# Patient Record
Sex: FEMALE | Race: WHITE | NOT HISPANIC OR LATINO | Employment: FULL TIME | URBAN - METROPOLITAN AREA
[De-identification: names, ages, dates, MRNs, and addresses within clinical notes are randomized per-mention and may not be internally consistent; named-entity substitution may affect disease eponyms.]

---

## 2020-08-12 ENCOUNTER — APPOINTMENT (OUTPATIENT)
Dept: RHEUMATOLOGY | Facility: MEDICAL CENTER | Age: 35
End: 2020-08-12
Payer: MEDICAID

## 2020-08-17 ENCOUNTER — OFFICE VISIT (OUTPATIENT)
Dept: RHEUMATOLOGY | Facility: MEDICAL CENTER | Age: 35
End: 2020-08-17
Payer: MEDICAID

## 2020-08-17 ENCOUNTER — APPOINTMENT (OUTPATIENT)
Dept: RADIOLOGY | Facility: IMAGING CENTER | Age: 35
End: 2020-08-17
Attending: INTERNAL MEDICINE
Payer: MEDICAID

## 2020-08-17 VITALS
WEIGHT: 201 LBS | RESPIRATION RATE: 14 BRPM | OXYGEN SATURATION: 96 % | HEIGHT: 71 IN | BODY MASS INDEX: 28.14 KG/M2 | SYSTOLIC BLOOD PRESSURE: 110 MMHG | DIASTOLIC BLOOD PRESSURE: 60 MMHG | TEMPERATURE: 98.3 F | HEART RATE: 82 BPM

## 2020-08-17 DIAGNOSIS — Z79.899 LONG-TERM USE OF PLAQUENIL: ICD-10-CM

## 2020-08-17 DIAGNOSIS — M05.79 RHEUMATOID ARTHRITIS INVOLVING MULTIPLE SITES WITH POSITIVE RHEUMATOID FACTOR (HCC): ICD-10-CM

## 2020-08-17 DIAGNOSIS — M06.9 RHEUMATOID ARTHRITIS, INVOLVING UNSPECIFIED SITE, UNSPECIFIED RHEUMATOID FACTOR PRESENCE: ICD-10-CM

## 2020-08-17 PROCEDURE — 99204 OFFICE O/P NEW MOD 45 MIN: CPT | Performed by: INTERNAL MEDICINE

## 2020-08-17 PROCEDURE — 77077 JOINT SURVEY SINGLE VIEW: CPT | Mod: TC | Performed by: INTERNAL MEDICINE

## 2020-08-17 PROCEDURE — 72070 X-RAY EXAM THORAC SPINE 2VWS: CPT | Mod: TC | Performed by: INTERNAL MEDICINE

## 2020-08-17 RX ORDER — HYDROXYCHLOROQUINE SULFATE 200 MG/1
TABLET, FILM COATED ORAL
Qty: 180 TAB | Refills: 0 | Status: SHIPPED | OUTPATIENT
Start: 2020-08-17

## 2020-08-17 RX ORDER — ACETAMINOPHEN 500 MG
500-1000 TABLET ORAL
COMMUNITY

## 2020-08-17 RX ORDER — VENLAFAXINE 75 MG/1
75 TABLET ORAL
COMMUNITY

## 2020-08-17 RX ORDER — CLONAZEPAM 1 MG/1
0.5 TABLET ORAL 2 TIMES DAILY
COMMUNITY

## 2020-08-17 RX ORDER — LAMOTRIGINE 100 MG/1
200 TABLET ORAL DAILY
COMMUNITY

## 2020-08-17 RX ORDER — MAGNESIUM OXIDE 400 MG/1
200 TABLET ORAL
COMMUNITY

## 2020-08-17 RX ORDER — CHLORAL HYDRATE 500 MG
1000 CAPSULE ORAL
COMMUNITY

## 2020-08-17 RX ORDER — LITHIUM CARBONATE 300 MG
300 TABLET ORAL 2 TIMES DAILY
COMMUNITY

## 2020-08-17 ASSESSMENT — JOINT PAIN
TOTAL NUMBER OF TENDER JOINTS: 8
TOTAL NUMBER OF SWOLLEN JOINTS: 0
TOTAL NUMBER OF TENDER JOINTS: 12

## 2020-08-17 NOTE — ASSESSMENT & PLAN NOTE
Patient here to be evaluated for RA. Patient states symptoms started at the age of 13 years aol with symptoms of pain in joints following a respiratory illness/flu like symptoms that kep relapsing. Patient states that while evaluating and states that she had a positive RF. Patient took Motrin with benefit. Patient states that while she was pregnant patient did feel better, but then after delivery, symptoms recurred with a vengeance. Patient was then rechecked at 29 years old and states was positive RF and again at 34 years old and was positive, was started on Plaquenil for the last year. Patient currently off for the last 2 months secondary to finances. Patient was on for 9 months which did help.    Co-morbidities include chronic low back pain, bipolar disorder onlithium, tachycardia, hx of depression,     Fam Hx  M-OA, HTN, psoriasis  F-COPD, diabetes, obesity, mental disorders  Sisters 1/2- schizophrenia, and bipolar  Great Aunt-RA    Soc HX  No tob  ETOH 3-4 x/week  No blood tx  No tattoos    S/p prednisone-causes daniel

## 2020-08-17 NOTE — PROGRESS NOTES
Chief Complaint-joint pain    Chief Complaint   Patient presents with   • New Patient     Jazlyn Downey is a 35 y.o. female here as a new Rheumatology Consult referred by Eldon Estrada M.D.  HPI:   Rheumatoid arthritis involving multiple sites with positive rheumatoid factor (HCC)  Patient here to be evaluated for RA. Patient states symptoms started at the age of 13 years aol with symptoms of pain in joints following a respiratory illness/flu like symptoms that kept relapsing. Patient states that while evaluating for the relapsing flu symptoms, patient states that she had a positive RF. Patient took Motrin with benefit. Patient states that while she was pregnant patient did feel better, but then after delivery, symptoms recurred with a vengeance. Patient was then rechecked at 29 years old and states was positive RF and again at 34 years old and was positive, was started on Plaquenil for the last year. Patient currently off for the last 2 months secondary to finances. Patient was on for 9 months which did help per patient report.  Patient denies any side effects from the medication, denies any unexplained weight loss, denies any fevers of unknown etiology, denies any GI upset, denies any rashes, denies any new joint swelling, denies recurrent infections.       Co-morbidities include chronic low back pain/scoliosis, bipolar disorder on lithium, tachycardia, hx of depression,     Of note as patient is getting treated for bipolar disorder and depression, this is considered high risk situations for potential abuse, will not build to prescribe narcotic pain medications for this patient in this clinic.    Fam Hx  M-OA, HTN, psoriasis  F-COPD, diabetes, obesity, mental disorders  Sisters 1/2- schizophrenia, and bipolar  Great Aunt-RA    Soc HX  No tob  ETOH 3-4 x/week  No blood tx  No tattoos    S/p prednisone-causes daniel    Addendum 8/18/2020  Feet x-rays 8/2020-unremarkable feet x-rays    Current medicines  (including changes today)  Current Outpatient Medications   Medication Sig Dispense Refill   • venlafaxine (EFFEXOR) 75 MG Tab Take 75 mg by mouth 1 time daily as needed.     • lithium (ESKALITH) 300 MG Tab Take 300 mg by mouth 2 times a day.     • lamoTRIgine (LAMICTAL) 100 MG Tab Take 200 mg by mouth every day.     • clonazePAM (KLONOPIN) 1 MG Tab Take 0.5 mg by mouth 2 times a day.     • Omega-3 Fatty Acids (FISH OIL) 1000 MG Cap capsule Take 1,000 mg by mouth 3 times a day, with meals.     • magnesium oxide (MAG-OX) 400 MG Tab tablet Take 200 mg by mouth every 3 days as needed.     • multivitamin (THERAGRAN) Tab Take 1 Tab by mouth every day.     • acetaminophen (TYLENOL) 500 MG Tab Take 500-1,000 mg by mouth 1 time daily as needed.     • hydroxychloroquine (PLAQUENIL) 200 MG Tab 1 tab po bid 180 Tab 0     No current facility-administered medications for this visit.      She  has no past medical history on file.  She  has no past surgical history on file.  History reviewed. No pertinent family history.  No family status information on file.     Social History     Tobacco Use   • Smoking status: Never Smoker   • Smokeless tobacco: Never Used   Substance Use Topics   • Alcohol use: Not on file   • Drug use: Not on file     Social History     Social History Narrative   • Not on file       ROS   Other than what is mentioned in HPI or physical exam, there is no history of headaches, double vision or blurred vision. No temporal tenderness or jaw claudication.  No trouble swallowing difficulties or sore throats.  No chest complaints including chest pain, cough or sputum production. No GI complaints including nausea, vomiting, change in bowel habits, or past peptic ulcer disease. No history of blood in the stools. No urinary complaints including dysuria or frequency. No history of alopecia, photosensitivity, oral ulcerations, Raynaud's phenomena.       Objective:     /60   Pulse 82   Temp 36.8 °C (98.3 °F)  "(Temporal)   Resp 14   Ht 1.803 m (5' 11\")   Wt 91.2 kg (201 lb)   SpO2 96%  Body mass index is 28.03 kg/m².  Physical Exam:    Constitutional: Alert and oriented X3, no distress.Skin: Warm, dry, good turgor, flaky skin on knuckles as well as around thumb, patient admits to flaky skin in the scalp as well  Eye: Equal, round and reactive, conjunctiva clear, lids normal EOM intactENMT: Lips without lesions, good dentition, no oropharyngeal ulcers, moist buccal mucosa, pinna without deformityNeck: Trachea midline, no masses, no thyromegaly.Lymph:  No cervical lymphadenopathy, no axillary lymphadenopathy, no supraclavicular lymphadenopathyRespiratory: Unlabored respiratory effort, lungs clear to auscultation, no wheezes, no ronchi.Cardiovascular: Normal S1, S2, no murmur, no edema.Abdomen: Soft, non-tender, no masses, no hepatosplenomegaly.Psych: Alert and oriented x3, normal affect and mood.Neuro Cranial nerves 2-12 are grossly intact, no loss of sensation LEExt:no joint laxity noted in bilateral arms, no joint laxity noted in bilateral legs, patient does bony hypertrophy of bilateral ulnar styloids bilaterally, no amanda swan-neck or boutonniere deformities, minimal synovitis around the ankles, shoulders for range of motion without limitations, patient has hyperextensible bilateral elbows, no nodules no tophi             Assessment and Plan:  1. Rheumatoid arthritis, involving unspecified site, unspecified rheumatoid factor presence (HCC)  Today we will reevaluate rheumatoid arthritis check rheumatoid factor, CCP, also today do hand x-rays, solicit for results of feet x-rays from [facility], other consideration is possible psoriatic arthritis, patient does have rash on her hands, does report flaky scalp and low back pain, we will check results of MRI of LS spine from [facility] for evaluation of sacroiliitis.  At this point will start Plaquenil 200 mg p.o. twice daily, may be adjusting as we get more " information from x-rays and labs.  - RHEUMATOID ARTHRITIS FACTOR; Future  - CCP ANTIBODY; Future  - G6PD QUANT + RBC; Future  - Comp Metabolic Panel; Future  - CBC WITH DIFFERENTIAL; Future  - Sed Rate; Future  - DX-JOINT SURVEY-HANDS SINGLE VIEW; Future  - DX-THORACIC SPINE-2 VIEWS; Future  - hydroxychloroquine (PLAQUENIL) 200 MG Tab; 1 tab po bid  Dispense: 180 Tab; Refill: 0    2. Long-term use of Plaquenil  Start Plaquenil 200 mg p.o. twice daily, today check G6PD levels  Patient states her last ophthalmology evaluation was about May 2020 at [facility] in [place].  Today we will check a baseline CMP and CBC as well  - RHEUMATOID ARTHRITIS FACTOR; Future  - CCP ANTIBODY; Future  - G6PD QUANT + RBC; Future  - Comp Metabolic Panel; Future  - CBC WITH DIFFERENTIAL; Future  - Sed Rate; Future  - DX-JOINT SURVEY-HANDS SINGLE VIEW; Future  - DX-THORACIC SPINE-2 VIEWS; Future  - hydroxychloroquine (PLAQUENIL) 200 MG Tab; 1 tab po bid  Dispense: 180 Tab; Refill: 0    3. Depression/bipolar disorder  Currently being treated with benefit with lithium    Records requested.  Followup: Return in about 2 months (around 10/17/2020). or sooner prn to evaluate efficacy of the Plaquenil  Patient was seen 60 minutes face-to-face (excluding time for procedures)  of which more than 50% the time was spent counseling the patient regarding  rheumatological conditions and care. Therapy was discussed in detail.  Thank you for this referral.    Please note that this dictation was created using voice recognition software. I have made every reasonable attempt to correct obvious errors, but I expect that there are errors of grammar and possibly content that I did not discover before finalizing the note.